# Patient Record
Sex: FEMALE | URBAN - METROPOLITAN AREA
[De-identification: names, ages, dates, MRNs, and addresses within clinical notes are randomized per-mention and may not be internally consistent; named-entity substitution may affect disease eponyms.]

---

## 2022-06-16 ENCOUNTER — WEB ENCOUNTER (OUTPATIENT)
Dept: URBAN - METROPOLITAN AREA TELEHEALTH 2 | Facility: TELEHEALTH | Age: 67
End: 2022-06-16

## 2022-06-20 ENCOUNTER — OFFICE VISIT (OUTPATIENT)
Dept: URBAN - METROPOLITAN AREA TELEHEALTH 2 | Facility: TELEHEALTH | Age: 67
End: 2022-06-20
Payer: MEDICARE

## 2022-06-20 ENCOUNTER — DASHBOARD ENCOUNTERS (OUTPATIENT)
Age: 67
End: 2022-06-20

## 2022-06-20 VITALS — HEIGHT: 67 IN | BODY MASS INDEX: 19.15 KG/M2 | WEIGHT: 122 LBS

## 2022-06-20 DIAGNOSIS — Z12.11 SCREENING COLONOSCOPY: ICD-10-CM

## 2022-06-20 PROCEDURE — 99202 OFFICE O/P NEW SF 15 MIN: CPT | Performed by: INTERNAL MEDICINE

## 2022-06-20 RX ORDER — ATORVASTATIN CALCIUM 20 MG/1
1 TABLET TABLET, FILM COATED ORAL ONCE A DAY
Status: ACTIVE | COMMUNITY

## 2022-06-20 RX ORDER — METOPROLOL SUCCINATE 200 MG/1
1 TABLET TABLET, FILM COATED, EXTENDED RELEASE ORAL ONCE A DAY
Status: ACTIVE | COMMUNITY

## 2022-06-20 RX ORDER — POLYETHYLENE GLYCOL 3350, SODIUM CHLORIDE, SODIUM BICARBONATE, POTASSIUM CHLORIDE 420; 11.2; 5.72; 1.48 G/4L; G/4L; G/4L; G/4L
AS DIRECTED POWDER, FOR SOLUTION ORAL ONCE
Qty: 1 BOTTLE | Refills: 0 | OUTPATIENT
Start: 2022-06-20 | End: 2022-06-21

## 2022-06-20 RX ORDER — LOSARTAN POTASSIUM 100 MG/1
1 TABLET TABLET ORAL ONCE A DAY
Status: ACTIVE | COMMUNITY

## 2022-06-20 RX ORDER — NITROGLYCERIN 0.4 MG/1
AS DIRECTED TABLET SUBLINGUAL
Status: ACTIVE | COMMUNITY

## 2022-06-20 NOTE — HPI-TODAY'S VISIT:
The patient was referred by Dr. Elizabeth Mcneill for colonoscopy   A copy of this document is being forwarded to the referring provider. Overall the patient has been feeling well.  The patient denies anorexia or weight loss.  In general the patient has regular BM's.  In general the patient denies constipation, diarrhea, hematochezia, BRBPR, or melena.  The patient denies abdominal pain.  The patient denies GERD/N/V/dysphagia.  There is no FHx of colon cancer.  The patient has never had prior colonoscopy.  She is schedueld for a [facility] this week with Dr. Bernard

## 2022-06-23 ENCOUNTER — OUT OF OFFICE VISIT (OUTPATIENT)
Dept: URBAN - METROPOLITAN AREA MEDICAL CENTER 39 | Facility: MEDICAL CENTER | Age: 67
End: 2022-06-23
Payer: MEDICARE

## 2022-06-23 DIAGNOSIS — R17 CHOLESTATIC JAUNDICE: ICD-10-CM

## 2022-06-23 DIAGNOSIS — R07.89 ACUTE CHEST WALL PAIN: ICD-10-CM

## 2022-06-23 PROCEDURE — 99222 1ST HOSP IP/OBS MODERATE 55: CPT | Performed by: INTERNAL MEDICINE

## 2022-06-23 PROCEDURE — 99233 SBSQ HOSP IP/OBS HIGH 50: CPT | Performed by: INTERNAL MEDICINE

## 2022-06-23 PROCEDURE — G8427 DOCREV CUR MEDS BY ELIG CLIN: HCPCS | Performed by: INTERNAL MEDICINE

## 2022-06-27 ENCOUNTER — OUT OF OFFICE VISIT (OUTPATIENT)
Dept: URBAN - METROPOLITAN AREA MEDICAL CENTER 39 | Facility: MEDICAL CENTER | Age: 67
End: 2022-06-27
Payer: MEDICARE

## 2022-06-27 DIAGNOSIS — R74.01 ABNORMAL/ELEVATED TRANSAMINASE (SGOT, AMINOTRANSFERASE): ICD-10-CM

## 2022-06-27 DIAGNOSIS — E80.6 ABNORMAL BILIRUBIN TEST: ICD-10-CM

## 2022-06-27 PROCEDURE — 99233 SBSQ HOSP IP/OBS HIGH 50: CPT | Performed by: INTERNAL MEDICINE
